# Patient Record
Sex: MALE | Race: WHITE
[De-identification: names, ages, dates, MRNs, and addresses within clinical notes are randomized per-mention and may not be internally consistent; named-entity substitution may affect disease eponyms.]

---

## 2017-01-18 ENCOUNTER — HOSPITAL ENCOUNTER (OUTPATIENT)
Dept: HOSPITAL 84 - D.OPS | Age: 47
Discharge: HOME | End: 2017-01-18
Attending: PODIATRIST
Payer: COMMERCIAL

## 2017-01-18 VITALS — BODY MASS INDEX: 32.9 KG/M2 | WEIGHT: 235 LBS | HEIGHT: 71 IN

## 2017-01-18 VITALS — DIASTOLIC BLOOD PRESSURE: 95 MMHG | SYSTOLIC BLOOD PRESSURE: 148 MMHG

## 2017-01-18 DIAGNOSIS — M13.872: Primary | ICD-10-CM

## 2017-01-18 NOTE — NUR
1230--DISCHARGE INSTRUCTIONS GIVEN, PT VERBALIZES UNDERSTANDING. PT
OFF UNIT VIA DARIN. HORACIO BRADY

## 2017-02-01 NOTE — OP
PATIENT NAME:  JANEEN BIRD                     MEDICAL RECORD: [id number]
:72                                             LOCATION:[facility]          
                                                         ADMISSION DATE:        
SURGEON:  LIANET ALMARAZ DPM        
 
 
DATE OF OPERATION:  2017
 
PREOPERATIVE DIAGNOSIS:  Arthritis, left first metatarsophalangeal joint.
 
POSTOPERATIVE DIAGNOSIS:  Arthritis, left first metatarsophalangeal  joint.
 
PROCEDURE:  Left first MPJ fusion.
 
ANESTHESIA:  Local with IV sedation.
 
HEMOSTASIS:  Left thigh tourniquet at 350 mmHg.
 
PREOPERATIVE DETAILS:  The patient was taken to the OR and placed on the
operating table in a supine position.  This was followed by induction of general
anesthesia and infiltration of local anesthetic.  The left extremity was then
prepped and draped in the usual aseptic technique followed by exsanguination of
the extremity and inflation of tourniquet.  A 15 blade was used to create an
incision from the mid shaft to the first metatarsal near the IPJ of the hallux. 
The incision deepened down through subcutaneous tissue to the MPJ where a
longitudinal capsulotomy was made.  The head of the first metatarsal and base of
proximal phalanx were delivered.  There was significant osteoarthritis and
chondromalacia and spur formation on the dorsal aspect of the head of the first
metatarsal as well as significant arthritis of the base of the hallux.  This was
removed with a rongeur and a bone saw and then utilizing a guidewire, the head
of the first metatarsal was reamed with the cup reamer.  Then, a K-wire was
placed in the hallux and a cone reamer was used to ream the base of the proximal
phalanx.  At this time, the fixation and the wound was flushed copiously.  The
joint was then temporarily fixated with K-wire and a 5-hole plate with 1 hole
being across the joint was used to fixate the joint with excellent rigid
internal fixation and bone purchase with the screws and excellent alignment. 
The wound was flushed.  The deep tissue was closed with 2-0 Vicryl, the
subcutaneous tissue with 4-0 Rapide and the skin was closed with 4-0 Rapide in a
subcuticular technique followed by Dermabond, Adaptic, 4 x 4 and Conform were
used to dress the wound followed by application of a modified Nevarez compression
dressing.  Tourniquet was deflated.
 
POSTOPERATIVE DETAILS:  The patient tolerated the procedure well and left the OR
with vital signs stable and vascular status at preoperative levels.  The patient
was transferred for recovery per anesthesia in stable condition.
 
TRANSINT:[id number] Voice Confirmation ID: [id number] DOCUMENT ID: [id number]
                                           
                                           LIANET ALMARAZ DPM        
 
 
 
Electronically Signed by LIANET ALMARAZ on 17 at 0950
CC:                                                             [id number]
DICTATION DATE: 17 1033     :     17 1058      [facility] 
                                                                      17
[address] [address]

## 2019-12-23 ENCOUNTER — HOSPITAL ENCOUNTER (OUTPATIENT)
Dept: HOSPITAL 84 - D.CT | Age: 49
Discharge: HOME | End: 2019-12-23
Attending: FAMILY MEDICINE
Payer: MEDICAID

## 2019-12-23 VITALS — BODY MASS INDEX: 34.37 KG/M2 | HEIGHT: 71 IN | WEIGHT: 245.52 LBS

## 2019-12-23 DIAGNOSIS — R59.0: Primary | ICD-10-CM

## 2019-12-23 LAB
ANION GAP SERPL CALC-SCNC: 11 MMOL/L (ref 8–16)
APTT BLD: 32.8 SECONDS (ref 22.8–39.4)
BASOPHILS NFR BLD AUTO: 0.7 % (ref 0–2)
BUN SERPL-MCNC: 17 MG/DL (ref 7–18)
CALCIUM SERPL-MCNC: 8.8 MG/DL (ref 8.5–10.1)
CHLORIDE SERPL-SCNC: 106 MMOL/L (ref 98–107)
CO2 SERPL-SCNC: 27.9 MMOL/L (ref 21–32)
CREAT SERPL-MCNC: 1 MG/DL (ref 0.6–1.3)
EOSINOPHIL NFR BLD: 6.2 % (ref 0–7)
ERYTHROCYTE [DISTWIDTH] IN BLOOD BY AUTOMATED COUNT: 12.9 % (ref 11.5–14.5)
GLUCOSE SERPL-MCNC: 99 MG/DL (ref 74–106)
HCT VFR BLD CALC: 44.3 % (ref 42–54)
HGB BLD-MCNC: 15.2 G/DL (ref 13.5–17.5)
IMM GRANULOCYTES NFR BLD: 0.1 % (ref 0–5)
INR PPP: 0.93 (ref 0.85–1.17)
LYMPHOCYTES NFR BLD AUTO: 30.7 % (ref 15–50)
MCH RBC QN AUTO: 32.1 PG (ref 26–34)
MCHC RBC AUTO-ENTMCNC: 34.3 G/DL (ref 31–37)
MCV RBC: 93.5 FL (ref 80–100)
MONOCYTES NFR BLD: 10.6 % (ref 2–11)
NEUTROPHILS NFR BLD AUTO: 51.7 % (ref 40–80)
OSMOLALITY SERPL CALC.SUM OF ELEC: 282 MOSM/KG (ref 275–300)
PLATELET # BLD: 325 10X3/UL (ref 130–400)
PMV BLD AUTO: 9 FL (ref 7.4–10.4)
POTASSIUM SERPL-SCNC: 3.9 MMOL/L (ref 3.5–5.1)
PROTHROMBIN TIME: 12 SECONDS (ref 11.6–15)
RBC # BLD AUTO: 4.74 10X6/UL (ref 4.2–6.1)
SODIUM SERPL-SCNC: 141 MMOL/L (ref 136–145)
WBC # BLD AUTO: 7.1 10X3/UL (ref 4.8–10.8)

## 2019-12-23 NOTE — NUR
0950 VITAL SIGNS BEING RECORDED ON POST PROCEDURE FORM SHEET AND PART
OF THE PAPER CHART.
1055 BANDAID CDI. TISSUE SOFT AROUND BANDAID. NO NECK SWELLING.
TRACHEA MIDLINE.
1100 IV DC'D. CATHETER TIP INTACT. NO BLEEDING AT SITE. BANDAID
APPLIED.

## 2020-01-24 ENCOUNTER — HOSPITAL ENCOUNTER (OUTPATIENT)
Dept: HOSPITAL 84 - D.OPS | Age: 50
Discharge: HOME | End: 2020-01-24
Attending: OTOLARYNGOLOGY
Payer: COMMERCIAL

## 2020-01-24 VITALS — HEIGHT: 71 IN | BODY MASS INDEX: 33.6 KG/M2 | WEIGHT: 240 LBS

## 2020-01-24 VITALS — SYSTOLIC BLOOD PRESSURE: 137 MMHG | DIASTOLIC BLOOD PRESSURE: 86 MMHG

## 2020-01-24 DIAGNOSIS — I10: ICD-10-CM

## 2020-01-24 DIAGNOSIS — C76.0: Primary | ICD-10-CM

## 2020-01-27 NOTE — OP
PATIENT NAME:  JANEEN BIRD                     MEDICAL RECORD: [id number]
:72                                             LOCATION:[facility]          
                                                         ADMISSION DATE:        
SURGEON:  RAMONA DUPREE MD                
 
 
DATE OF OPERATION:  2020
 
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
to the right neck.
 
POSTOPERATIVE DIAGNOSIS:  Unknown primary with squamous cell carcinoma
metastatic to the right neck.
 
PROCEDURE:  Direct laryngoscopy with biopsy, tonsillectomy, esophagoscopy.
 
SURGEON:  Ramona Dupree MD
 
ANESTHESIA:  General orotracheal.
 
BLOOD LOSS:  Less than 5 cc.
 
SPECIMENS:  Multiple biopsies of the right lateral piriform sinus; biopsies of
the right and left base of tongue, right and left tonsils; and a biopsy of the
nasopharynx.
 
COMPLICATIONS:  None.
 
DISPOSITION:  Recovery stable.
 
FINDINGS:  The tonsils were both small firm, not particularly remarkable.  Base
of tongue was soft with no obvious lesions.  Nasopharynx was clean.  There was
some granular area a little bit friable on the right lateral piriform not
extending towards the apex, but at about the level of the arytenoid on that
right side that I took biopsies of.  Whole area was about 1 cm, slightly
abnormal, but not particularly suspicious.
 
DESCRIPTION OF PROCEDURE:  He was brought to the operating room and placed in
supine position, sedated and intubated by anesthesia.  Eyes were taped.  Table
was turned to 90 degrees.  Head drapes applied.  He was positioned for
endoscopy.  First using a headlight, the oral cavity and oropharynx was
examined.  Upper and lower gingival buccal sulcus, the palate, the base of the
tongue, the floor of the mouth, lateral pharyngeal walls, and tonsils were all
carefully inspected, examined, and palpated.  Tonsils were a little firm, but
nothing particularly suspicious was seen.  Plastic upper tooth guard was placed
and Kleinsasser and J laryngoscope was used to examine the lateral pharyngeal
walls, posterior pharyngeal wall, base of tongue, vallecula, hypopharynx, the
epiglottis, supraglottic larynx, postcricoid area, piriforms, false cords, and
the larynx.  Really, the only thing seen was a little friability in an area in
the upper lateral piriform sinus on the right side.  I used a 2-mm upbiting cup
forceps to take 4 biopsies of that, the whole area was about a centimeter in
diameter, so that took most of that area and biopsy, it really was a
superficial, but a little erythematous.  The laryngoscope was then removed and
the base of the tongue was then examined carefully.  Again with a Kleinsasser
laryngoscope looking at the base of the tongue, nothing could be seen or
palpated, but I took 4-mm upbiting cup forceps and took 4 good deep biopsies of
both sides of the base of the tongue, those were both sent separately.  The
laryngoscope was then removed.  Belinda-Ariel mouth gag was then carefully
 
 
 
OPERATIVE REPORT                               [id number]    PELONJANEEN AIKEN DANILO   
 
 
inserted and elevated.  Red rubber catheter was placed to the right side of the
nose into the pharynx and grasped with tonsil clamp to retract the soft palate. 
Using a mirror, the nasopharynx was examined.  Posterior aspect of the palate,
the choanae, posterior nasal cavity, and nasopharynx was all really
unremarkable.  I used a straight 4-mm cup forceps through both sides of the nose
and took a large biopsy superiorly and inferiorly of the nasopharynx on both
sides.  Those were sent as a separate specimen as well.  There was a little bit
of bleeding that was controlled with suction cautery and then the red rubber
catheter was let down and removed.  The right tonsil was then grasped at
superior pole with a straight Allis clamp.  Spatula tip cautery on a setting of
8 was used to dissect out the tonsil along its capsule preserving the anterior
and posterior tonsillar pillar.  The left tonsil was removed in the same
fashion.  No lesions were identified.  Both sides of the nose and the pharynx
were irrigated with saline.  The tonsillar fossae were agitated.  Suction
cautery on a setting of 18 was used to control minimal oozing from the tonsils. 
With the field completely clean and dry, the Belinda-Ariel mouth gag was let down
and removed.  Then, a cervical esophagoscope was inserted again with the plastic
tooth guard easily into the postcricoid area.  The arytenoid postcricoid area
and esophageal inlet were normal.  This was advanced to about 25 cm dave.  The
esophagus was completely clean and unremarkable in appearance.  No biopsies were
taken.  The esophagoscope was removed.  Plastic tooth guard was removed.  He was
repositioned, he was awakened, extubated, and transported to recovery in good
condition.  No complications.
 
TRANSINT:[id number] Voice Confirmation ID: [id number] DOCUMENT ID: [id number]
                                           
                                           RAMONA DUPREE MD                
 
 
 
Electronically Signed by RAMONA DUPREE on 20 at 1228
 
 
 
 
 
 
 
 
 
 
 
 
 
 
 
 
CC:                                                             [id number]
DICTATION DATE: 20 1134     :     20 1245      [facility] 
                                                                      20
[place] [id number] [place] [phone]

## 2020-01-27 NOTE — HP
PATIENT: JANEEN HUNT                           MEDICAL RECORD: [id number]
ACCOUNT: [id number]                                    [facility]ATION:[facility]         
: 72                                            ADMISSION DATE: 20
                                                         PCP: MAO HORN MD   
 
                             HISTORY AND PHYSICAL EXAMINATION
 
 
PREOPERATIVE HISTORY AND PHYSICAL
 
HISTORY OF PRESENT ILLNESS:  Mr. Hunt is 49 years old.  He was sent over by
Dr. Redding.  He has a right cervical node positive for squamous cell carcinoma
on biopsy.  A PET scan only shows that node.  A CT and PET scan have been
negative for other lesions.  ENT exam is negative as well.  He is being admitted
for direct laryngoscopy, esophagoscopy, tonsillectomy and biopsies of
nasopharynx base of the tongue to try to identify the source of unknown primary.
 
PAST MEDICAL HISTORY:  Includes hypertension.
 
PAST SURGICAL HISTORY:  Includes left knee and foot surgery, right wrist and
shoulder surgery.
 
CURRENT MEDICATIONS:  None.
 
ALLERGIES:  No known drug allergies.
 
PHYSICAL EXAMINATION:
GENERAL:  Healthy-appearing.
FACE:  Normal, symmetric, no lesions.
EYES:  Sclerae and conjunctivae are normal.
EARS:  Canals and TMs normal.
NOSE:  No mass, polyps or drainage.
ORAL CAVITY AND OROPHARYNX:  Normal.
NECK:  Right level 2/3 lymph node, nontender.
CHEST:  Clear.
CARDIOVASCULAR:  Regular rate and rhythm, no murmur.
EXTREMITIES:  Normal.
 
IMPRESSION:  Unknown primary squamous cell carcinoma with metastatic to right
neck.
 
PLAN:  Panendoscopy and tonsillectomy.
 
TRANSINT:[id number] Voice Confirmation ID: [id number] DOCUMENT ID: [id number]
 
 
                                           
                                           RAMONA LORENZO MD                
 
 
 
Electronically Signed by RAMONA LORENZO on 20 at 1228
CC:                                                             [id number]
DICTATION DATE: 20 1449     :     20 1525      [facility] 
                                                                      20
[place] [id number] [place] [phone]